# Patient Record
Sex: MALE | Race: WHITE | Employment: FULL TIME | URBAN - METROPOLITAN AREA
[De-identification: names, ages, dates, MRNs, and addresses within clinical notes are randomized per-mention and may not be internally consistent; named-entity substitution may affect disease eponyms.]

---

## 2019-01-18 ENCOUNTER — HOSPITAL ENCOUNTER (OUTPATIENT)
Dept: GENERAL RADIOLOGY | Age: 29
Discharge: HOME | End: 2019-01-20
Payer: COMMERCIAL

## 2019-01-18 ENCOUNTER — HOSPITAL ENCOUNTER (OUTPATIENT)
Age: 29
Discharge: HOME | End: 2019-01-20
Payer: COMMERCIAL

## 2019-01-18 DIAGNOSIS — S13.4XXA SPRAIN OF LIGAMENTS OF CERVICAL SPINE, INITIAL ENCOUNTER: ICD-10-CM

## 2019-01-18 PROCEDURE — 72050 X-RAY EXAM NECK SPINE 4/5VWS: CPT

## 2022-02-14 ENCOUNTER — HOSPITAL ENCOUNTER (OUTPATIENT)
Dept: PHYSICAL THERAPY | Age: 32
Setting detail: THERAPIES SERIES
Discharge: HOME | End: 2022-02-14
Payer: COMMERCIAL

## 2022-02-14 PROCEDURE — 97161 PT EVAL LOW COMPLEX 20 MIN: CPT

## 2022-02-14 NOTE — PROGRESS NOTES
Physical Therapy  Initial Assessment  Date: 2022  Patient Name: Jade Spence  MRN: [id number]  : 1990          Restrictions       Subjective   General  Chart Reviewed: Yes  Patient assessed for rehabilitation services?: Yes  Additional Pertinent Hx: Client presents to PT to assess and treat an acute left shoulder/left anterior chest injury. Per Client: He was working as a  for [employer]. Client was applying medial pressure to 2 pipe wrenches when he heard and felt \"a pop\" in the left anterior shoulder region Client noted immediate onset of pain and limited function Later client noted onset of edema and significant bruising  Family / Caregiver Present: No  Referring Practitioner: Dr Jaleel Artis  Referral Date : 22  Diagnosis: Left Shoulder/Left Anterior Chest injury  Follows Commands: Within Functional Limits  PT Visit Information  Onset Date: 22  PT Insurance Information: [employer]  Total # of Visits Approved: 10  Plan of Care/Certification Expiration Date: 22  Subjective  Subjective: Pain along the lateral aspect of the left anterior chest/pectoralis region Pain noted with direct palaption and with hoding or pushing up with the left UE  Pain Screening  Patient Currently in Pain: No  Vital Signs  Patient Currently in Pain: No    Vision/Hearing  Vision  Vision: Within Functional Limits  Hearing  Hearing: Within functional limits    Orientation  Orientation  Overall Orientation Status: Within Functional Limits    Social/Functional History  Social/Functional History  Lives With: Family  Type of Home: House  Receives Help From: Family  Homemaking Responsibilities: Yes  Active : Yes  Mode of Transportation: Car  Occupation: Workers comp; Full time employment    Objective     Observation/Palpation  Posture: Fair  Palpation: Pain noted with palpation deep lateral border of the left anterior chest. No palpable defect noted in the left pec Major mucsle or left Pec Minor tendon. Observation: No asymmetry noted right pec region vs left pec region Modest visible edema left anteriro lateral chest    PROM RUE (degrees)  RUE PROM: WFL  AROM RUE (degrees)  RUE AROM : WFL  PROM LUE (degrees)  LUE PROM: WFL  LUE General PROM: Pain noted with left shoulder horizontal adduciotn and ER  AROM LUE (degrees)  LUE General AROM: Limited shoulder abduction ER and horizontal adduaction Pain noted with AROM    Strength RUE  Comment: 4+/5  Strength LUE  Comment: 4/5  Strength Other  Other: Left Anteriro chest 4/5 Paijn noted with resisted shoulder adduction                                                   Assessment   Conditions Requiring Skilled Therapeutic Intervention  Body structures, Functions, Activity limitations: Decreased functional mobility ; Increased pain;Decreased ROM; Decreased strength  Assessment: left Anterior shoulder/chest region pain Symptoms suggest involvement of the Right Pec Minor muscle but additional or alternative pathologies cannot be ruled out at present  Prognosis: Fair  Decision Making: Low Complexity  REQUIRES PT FOLLOW UP: Yes         Plan   Plan  Times per week: 2-3  Plan weeks: 4-5  Current Treatment Recommendations: Strengthening,Home Exercise Program,Manual Therapy - Soft Tissue Mobilization,ROM,Patient/Caregiver Education & Training,Functional Mobility Training,Modalities    G-Code       OutComes Score                                                  AM-PAC Score             Goals          Therapy Time   Individual Concurrent Group Co-treatment   Time In 1400         Time Out 1440         Minutes 40         Timed Code Treatment Minutes: 27 Minutes       Tony Severe, PT

## 2022-02-15 ENCOUNTER — HOSPITAL ENCOUNTER (OUTPATIENT)
Dept: PHYSICAL THERAPY | Age: 32
Setting detail: THERAPIES SERIES
Discharge: HOME | End: 2022-02-15
Payer: COMMERCIAL

## 2022-02-15 PROCEDURE — G0283 ELEC STIM OTHER THAN WOUND: HCPCS

## 2022-02-15 PROCEDURE — 97110 THERAPEUTIC EXERCISES: CPT

## 2022-02-15 NOTE — PROGRESS NOTES
[facility]  Phone: [phone] Fax: [phone]       Physical Therapy Daily Treatment Note  Date:  2/15/2022    Patient Name:  Amanda Ontiveros   :  1990    Restrictions/Precautions:    Diagnosis:  Left Shoulder/chest wall injury  Treatment Diagnosis:    Insurance/Certification information:    Referring Physician:    Plan of care signed (Y/N):    Visit# / total visits:  /  Pain level: /10  Time In:   1400     Time Out:  1455        Subjective:      Exercises:  Exercise/Equipment Resistance/Repetitions Other comments            Seated flex with ball  10 reps       Supine Shoulder flexion 10 reps 2 sets      Supine Shoulder scaption 10 reps bilateral 2 sets       Supine Shoulder neville 10 reps 2 sets                                                                                                            Other Therapeutic Activities:      Home Exercise Program:      Manual Treatments:  GH and scapular ROM with gentle joint mobilization     Modalities:  Pre Mod estim left shoulder/left scapular region 20 min     Timed Code Treatment Minutes:  30    Total Treatment Minutes:  55    Treatment/Activity Tolerance:  [x] Patient tolerated treatment well [] Patient limited by fatigue  [] Patient limited by pain  [] Patient limited by other medical complications  [] Other:     Plan:   [x] Continue per plan of care [] Alter current plan (see comments)  [] Plan of care initiated [] Hold pending MD visit [] Discharge  Plan for Next Session:         Treatment Charges: Mins Units   Initial Evaluation     Re-Evaluation     Ther Exercise         TE 15 1   Manual Therapy     MT 15 1   Ther Activities        TA     Gait Training          GT     Neuro Re-education NR     Modalities 20 1   Non-Billable Service Time 5    Other     Total Time/Units 55 2     Electronically signed by:  Cain Ricardo, [address] [place] [address]

## 2022-02-17 ENCOUNTER — HOSPITAL ENCOUNTER (OUTPATIENT)
Dept: PHYSICAL THERAPY | Age: 32
Setting detail: THERAPIES SERIES
Discharge: HOME | End: 2022-02-17
Payer: COMMERCIAL

## 2022-02-17 PROCEDURE — 97140 MANUAL THERAPY 1/> REGIONS: CPT

## 2022-02-17 PROCEDURE — G0283 ELEC STIM OTHER THAN WOUND: HCPCS

## 2022-02-17 PROCEDURE — 97110 THERAPEUTIC EXERCISES: CPT

## 2022-02-17 NOTE — PROGRESS NOTES
[facility]  Phone: [phone] Fax: [phone]       Physical Therapy Daily Treatment Note  Date:  2022    Patient Name:  Usman Mcgee   :  1990    Restrictions/Precautions:    Diagnosis:  Left Shoulder/chest wall injury  Treatment Diagnosis:    Insurance/Certification information:    Referring Physician:    Plan of care signed (Y/N):    Visit# / total visits: 3/10  Pain level: /10  Time In:   1400     Time Out:  1606        Subjective:  Client reports pain has migrated into a more superior/medial aspect of the left shoulder since last session. Exercises:  Exercise/Equipment Resistance/Repetitions Other comments            Seated flex with ball  10 reps       Supine Shoulder flexion 10 reps 2 sets      Supine Shoulder scaption 10 reps bilateral 2 sets  nt     Supine Shoulder neville 10 reps 2 sets  nt                                                                                                          Other Therapeutic Activities:      Home Exercise Program:      Manual Treatments:  GH and scapular ROM with gentle joint mobilization     Modalities:  Pre Mod estim left shoulder/left scapular region 20 min     Timed Code Treatment Minutes:  30    Total Treatment Minutes:  55    Treatment/Activity Tolerance:  [x] Patient tolerated treatment well [] Patient limited by fatigue  [] Patient limited by pain  [] Patient limited by other medical complications  [x] Other: Pain was aggravated by [address] abduction above 90* and with shoulder IR/ER ROM In addition MMT of the left shoulder IRR's demonstrated significant weakness but was negative for pain     Plan:   [x] Continue per plan of care [] Alter current plan (see comments)  [] Plan of care initiated [] Hold pending MD visit [] Discharge  Recommendation: Symptoms suggest the possibility of an injury to the left Subscapularis muscle.  Recommend consideration of MRI to rule out this possibility         Treatment Charges: Mins Units   Initial Evaluation     Re-Evaluation     Ther Exercise         TE 15 1   Manual Therapy     MT 15 1   Ther Activities        TA     Gait Training          GT     Neuro Re-education NR     Modalities 20 1   Non-Billable Service Time 5    Other     Total Time/Units 55 3     Electronically signed by:  Florencio Macedo, [address] [facility]

## 2022-02-22 ENCOUNTER — HOSPITAL ENCOUNTER (OUTPATIENT)
Dept: PHYSICAL THERAPY | Age: 32
Setting detail: THERAPIES SERIES
Discharge: HOME | End: 2022-02-22
Payer: COMMERCIAL

## 2022-02-22 PROCEDURE — G0283 ELEC STIM OTHER THAN WOUND: HCPCS

## 2022-02-22 NOTE — PROGRESS NOTES
[facility]  Phone: [phone] Fax: [phone]       Physical Therapy Daily Treatment Note  Date:  2022    Patient Name:  Dionicio Luna   :  1990    Restrictions/Precautions:    Diagnosis:  Left Shoulder/chest wall injury  Treatment Diagnosis:    Insurance/Certification information:    Referring Physician:    Plan of care signed (Y/N):    Visit# / total visits: 4/10  Pain level: /10  Time In:   1400     Time Out:  1425        Subjective:  Client presented to PT reporting that the pain in his left shoulder w had persisted since last session and that he had noted the presence of new bruising over the area. Exercises:  Exercise/Equipment Resistance/Repetitions Other comments            Seated flex with ball  10 reps  nt     Supine Shoulder flexion 10 reps 2 sets nt     Supine Shoulder scaption 10 reps bilateral 2 sets  nt     Supine Shoulder neville 10 reps 2 sets  nt                                                                                                          Other Therapeutic Activities:      Home Exercise Program:      Manual Treatments:     Modalities:  Pre Mod estim left shoulder/left scapular region 20 min     Timed Code Treatment Minutes:  15    Total Treatment Minutes:  25    Treatment/Activity Tolerance:  [x] Patient tolerated treatment well [] Patient limited by fatigue  [] Patient limited by pain  [] Patient limited by other medical complications  [x] Other: visual examination of the left shoulder showed modest to moderate bruising over the patsy-medial aspect of the left shoulder medial to the axilla     Plan:   [x] Continue per plan of care [] Alter current plan (see comments)  [] Plan of care initiated [] Hold pending MD visit [] Discharge  Recommendation: Due to the apparent worsening of the bruising over the affected area the decision was made to hold any active treatment until client is seen by his physician.   An attempt was made to contact the physician but PT was only able to leave a message       Treatment Charges: Mins Units   Initial Evaluation     Re-Evaluation     Ther Exercise         TE     Manual Therapy     MT     Ther Activities        TA     Gait Training          GT     Neuro Re-education NR     Modalities 20 1   Non-Billable Service Time 5    Other     Total Time/Units 25 1     Electronically signed by:  Catie Bermudez, [address] [place] [address]

## 2022-03-04 ENCOUNTER — OFFICE VISIT (OUTPATIENT)
Dept: ORTHOPEDIC SURGERY | Age: 32
End: 2022-03-04
Payer: COMMERCIAL

## 2022-03-04 VITALS — BODY MASS INDEX: 29.12 KG/M2 | HEIGHT: 72 IN | WEIGHT: 215 LBS

## 2022-03-04 DIAGNOSIS — S29.011A STRAIN OF LEFT PECTORALIS MUSCLE, INITIAL ENCOUNTER: Primary | ICD-10-CM

## 2022-03-04 PROCEDURE — 99203 OFFICE O/P NEW LOW 30 MIN: CPT | Performed by: ORTHOPAEDIC SURGERY

## 2022-03-04 NOTE — PROGRESS NOTES
New Shoulder Patient Visit     Referring Provider:   Jessica Diaz MD  [address],  [address] [facility] [facility] [address]    CHIEF COMPLAINT:   Chief Complaint   Patient presents with    Shoulder Pain     L Shoulder. . PT has not helped pain    Injury     Pushing 2 pipewrenches together (hurt @ work)        HPI:      Lonnie Roca is a 32y.o. year old male who is seen today for concern for left pectoralis major tear. He states that about a month ago while working he was forcefully pushing to pipe wrenches together and felt a pop and pain over his left chest and his left arm. There was bruising following this. He was referred to a physician to [facility] and was prescribed physical therapy. He did note some improvement in pain, has been worried about really testing out the shoulder. He had a second incident where he felt another pop in again had some bruising. The bruising has for the most part resolved. He is not any pain at rest.  He has been doing light duty at work. He works as a . He is right-hand dominant      PAST MEDICAL HISTORY  Past Medical History:   Diagnosis Date    GERD (gastroesophageal reflux disease)        PAST SURGICAL HISTORY  Past Surgical History:   Procedure Laterality Date    HYDROCELE EXCISION  1991    TYMPANOSTOMY TUBE PLACEMENT  1993    UPPER GASTROINTESTINAL ENDOSCOPY  04/17/2015       FAMILY HISTORY   No family history on file. SOCIAL HISTORY  Social History     Occupational History    Not on file   Tobacco Use    Smoking status: Former Smoker    Smokeless tobacco: Former User   Substance and Sexual Activity    Alcohol use:  Yes     Alcohol/week: 1.0 standard drink     Types: 1 Cans of beer per week     Comment: socially     Drug use: Not on file    Sexual activity: Not on file       CURRENT MEDICATIONS     Current Outpatient Medications:     albuterol (PROVENTIL) (2.5 MG/3ML) 0.083% nebulizer solution, Take 2.5 mg by nebulization every 6 hours as needed for Wheezing, Disp: , Rfl:     ibuprofen (ADVIL;MOTRIN) 800 MG tablet, Take 1 tablet by mouth every 8 hours as needed for Pain, Disp: 15 tablet, Rfl: 0    omeprazole (PRILOSEC) 20 MG capsule, Take 40 mg by mouth daily. , Disp: , Rfl:     ALLERGIES  Allergies   Allergen Reactions    Ceclor [Cefaclor] Hives       Controlled Substances Monitoring:        REVIEW OF SYSTEMS:     Constitutional:  Negative for weight loss, fevers, chills, fatigue  Cardiovascular: Negative for chest pain, palpitations  Pulmonary: Negative for shortness of breath, labored breathing, cough  GI: negative for abdominal pain, nausea, vomitting   MSK: per HPI  Skin: negative for rash, open wounds    All other systems reviewed and are negative           PHYSICAL EXAM     Vitals:    03/04/22 1131   Weight: 215 lb (97.5 kg)   Height: 6' (1.829 m)       Height: 6' (1.829 m)  Weight: [unfilled]  BMI:  Body mass index is 29.16 kg/m². General: The patient is alert and oriented x 3, appears to be stated age and in no distress. HEENT: head is normocephalic, atraumatic. EOMI. Neck: supple, trachea midline, no thyromegaly   Cardiovascular: peripheral pulses palpable. Normal Capillary refill   Respiratory: breathing unlabored, chest expansion symmetric   Skin: no rash, no open wounds, no erythema  Psych: normal affect; mood stable  Neurologic: gait normal, sensation grossly intact in extremities  MSK:    Cervical Spine: There is no tenderness to palpation along the cervical spine. Range of motion is normal.  Spurling's is negative    Shoulder Exam:   Exam of the left shoulder shows full range of motion 180/60/T6. There is some resolving bruise just above the biceps muscle belly on the medial border of the upper arm. There is no obvious contour abnormality of his pec tendon compared to the right side. I do feel that I can palpate the tendon come across the axillary fold. I do not palpate any detached tendon obviously.   When he pushes against resistance contour the chest appears similar to the other side. He does have good strength with rotator cuff testing as well as biceps labral testing which is not painful    IMAGING:     XRAY:  3 views of the left shoulder show no acute abnormality    Radiographic findings reviewed with patient    ASSESSMENT   Left shoulder concern for pectoralis major  Tear now about a month out from his injury      PLAN  We discussed his injury today and my concern that he may have torn his pec. His exam is reassuring as I feel that I can palpate the tendon come across the axillary fold. We discussed this could have been a musculotendinous junction injury or incomplete injury. I would like to obtain an urgent MRI to assess for a tear that would potentially be amenable to surgery given he is now about a month out. He is in agreement with this plan.   We will have this approved through [facility], obtain MRI as soon as possible and I will call him with results to determine treatment moving forward        Marzena Bay MD  Orthopaedic Surgery   3/4/22  6:29 PM Slurred speech

## 2022-03-14 ENCOUNTER — HOSPITAL ENCOUNTER (OUTPATIENT)
Dept: MRI IMAGING | Age: 32
Discharge: HOME | End: 2022-03-16
Payer: COMMERCIAL

## 2022-03-14 DIAGNOSIS — S29.011A STRAIN OF LEFT PECTORALIS MUSCLE, INITIAL ENCOUNTER: ICD-10-CM

## 2022-03-14 PROCEDURE — 73221 MRI JOINT UPR EXTREM W/O DYE: CPT

## 2022-03-21 ENCOUNTER — OFFICE VISIT (OUTPATIENT)
Dept: ORTHOPEDIC SURGERY | Age: 32
End: 2022-03-21
Payer: COMMERCIAL

## 2022-03-21 VITALS — BODY MASS INDEX: 29.12 KG/M2 | WEIGHT: 215 LBS | HEIGHT: 72 IN

## 2022-03-21 DIAGNOSIS — S29.011A STRAIN OF LEFT PECTORALIS MUSCLE, INITIAL ENCOUNTER: Primary | ICD-10-CM

## 2022-03-21 PROCEDURE — 99213 OFFICE O/P EST LOW 20 MIN: CPT | Performed by: ORTHOPAEDIC SURGERY

## 2022-03-21 RX ORDER — ALBUTEROL SULFATE 90 UG/1
AEROSOL, METERED RESPIRATORY (INHALATION)
COMMUNITY
Start: 2022-03-04

## 2022-03-21 RX ORDER — MONTELUKAST SODIUM 10 MG/1
TABLET ORAL
COMMUNITY
Start: 2022-03-04

## 2022-03-21 NOTE — PROGRESS NOTES
Follow Up Visit     Jade Spence returns today for follow up visit regarding Left shoulder concern for pectoralis major  Tear now about 2 months out from his injury. Treatment thus far has included obtained further imaging, MRI , he is here today to review. He reports no pain in the shoulder. He has been on light duty. Physical Exam:     Height: 6' (1.829 m), Weight: 215 lb (97.5 kg) (per pt)    General: Alert and oriented x3, no acute distress  Cardiovascular/pulmonary: No labored breathing, peripheral perfusion intact  Musculoskeletal:    Exam of the left upper extremity today shows full range of motion of the shoulder. I can palpate his pectoralis major going across his axillary crease down to his humerus. There is no tenderness in this area, no swelling or bruising. He has good abduction strength against resistance, he can do a push-up against the wall without pain. He has excellent rotator cuff strength    Controlled Substances Monitoring:      Imaging:  MRI of the shoulder was reviewed. This shows concern for least high-grade partial injury involving his pectoralis major although there is still evidence of portion of the tendon extending down to the humerus. There are some perilabral cysts of the anterior-inferior glenoid of the unclear significance. Rotator cuff is grossly intact      Assessment: Left shoulder high-grade pectoralis major musculotendinous junction tear now 2 months out      Plan:   We discussed his shoulder and injury today. Discussed exam findings, I suspect this was a partial tear likely musculotendinous junction, rather than tendon avulsing from bone. This is supported by his exam which shows no abnormality in appearance across the axillary fold with palpable pectoralis major tendon. He also has no pain and full range of motion and excellent strength today. We discussed continued conservative treatment versus surgical management.   I think the indicated treatment at this point is continued conservative treatment given his no pain and good function. I do recommend another 4 weeks of light duty at work with no heavy lifting and I would like to get him back into some physical therapy. We will check him back in 1 month and likely release him to progress with activities as tolerated at that point.   He is in agreement with this plan    Danny Ireland MD  Orthopaedic Surgery   3/21/22  2:59 PM

## 2022-04-06 ENCOUNTER — HOSPITAL ENCOUNTER (OUTPATIENT)
Dept: PHYSICAL THERAPY | Age: 32
Setting detail: THERAPIES SERIES
Discharge: HOME | End: 2022-04-06
Payer: COMMERCIAL

## 2022-04-06 PROCEDURE — 97161 PT EVAL LOW COMPLEX 20 MIN: CPT

## 2022-04-06 NOTE — FLOWSHEET NOTE
[facility]  Phone: [phone] Fax: [phone] Initial Evaluation      Client Name: Svitlana Mora    Claim [id number]:[id number]  Evaluation date:04/06/2022                                     Job Title :[employer]   Diagnosis:_S29.011A                                    Normal Work Hrs: 40/wk  Referring Physician: Dr Logan Goldman                            Present work status:  First date of Lost time: 01/25/2022                         * Not working  ___  Date of Injury: 01/25/2022                                        Laya Perry Duty    __X__  Employer:   [address] [place] [address].  Duty     ____    Authorized Services:   _X__ Physical Therapy Exercises  ___ Work Conditioning  ___ Aquatics  _X__ Manual therapy  _X__ Electrical Stimulation, Traction, Ultrasound  __X_ Education/Body mechanics  ___ Ergonomic Assessment/FCE    Authorized Visits: ________  Sharmaine Baerwood ____18___Visits  By 05/06/2022   From 03/25/2022  To 05/06/2022    Vocational Status:  Employer: [employer]   Job Title: [employer]     Rehabilitation Problems/Impairments:  []Pain  ___/10 ___________________________________________  []Decreased ROM:________________________________________  []Joint Hypomobility:______________________________________  []Joint Hypermobility:______________________________________  []Muscle Spasms:_________________________________________  []Gait: __________________________________________________  [x]Decreased strength:Left shoulder region   []Unsafe body mechanics related to work/home activities  []Subjective reports of pain limiting work/home activities  []Inability to control onset symptoms  [x]Load handling strength levels below employable levels  [x]Work endurances less than required for employment levels  []Other:__________________________      Short Term Rehabilitation Goals:      [] Decrease pain __/10:______________________________________      [] Increase ROM:___________________________________________       [] Improve Joint play:________________________________________      [] Improve strength:_________________________________________       []Gait: __________________________________________________       [] Reduce Muscle Spasms:____________________________________                    [] Improve Body Mechanics associated with work/Home activities. [x] Instruct with symptom control techniques/ HEP                    [] Improve endurance levels                     [] Lift floor-waist __________#                     [] Lift waist-shoulder _______#                    [] Horizontal carry _________Ft ________#                    [] Elevated work ________min _________#                    [] Other___________________________________    Long Term Rehabilitation Goals:   [x]RTW  ( SJ/SE, SJ/DE, DJ/SE, DJ/DE)  []Decrease Pain ___/10   []Improve AROM  ( WNL), ________________________________  [x]Improve Strength to 4 to 4+/5  []Improve Joint play (WNL), _______________________________  []Body Mechanics:________________________________________              []Gait ______________________________  [x]Independent with HEP/Symptom control techniques:  []Lift Floor-waist_____#  []Lift waist-Shoulder________#  []Horizontal Carry ______ft______#  []Elevated work ____min____#  []Other:_______________________    Rehabilitation Recommendations:                    ?[x] Begin PT at __2-3____x/wk x ____6__wks. []Begin PT with Work Conditioning to follow at ______wks. []Begin Work-Conditioning and conclude with FCE _____                   []? Begin PT with Vocational Rehabilitation referral/request--[facility]                   ? [] Ergonomic Study/Job Analysis to compare FCE/clients abilities.     Recommendations For RTW accommodations:  None at this time           Physician: ____________________  Therapist: Marcela Rudd, [address]  : ____________________

## 2022-04-06 NOTE — PROGRESS NOTES
Physical Therapy  Initial Assessment  Date: 2022  Patient Name: Garrick Delacruz  MRN: [id number]  : 1990          Restrictions       Subjective   General  Chart Reviewed: Yes  Patient assessed for rehabilitation services?: Yes  Additional Pertinent Hx: Client presents to PT to resume PT secondary to and acute left shoulder injury. Left shoulder was injuredwhile client was working as a . Injury occurred 2022. Client initiated PT but services were interrupted when client experienced acuyte onset of bruising int he left shoulder Subsequent CT scan and MRI were + for an injury to the lpeft [place] muscle Client consulted with orthopedic physician Dr Conchita Clements Injury is considered non surgical at this time Client has been referred back to PT  Family / Caregiver Present: No  Referring Practitioner: Dr Conchita Clements  Referral Date : 22  Diagnosis: Left Shoulder injury  Follows Commands: Within Functional Limits  PT Visit Information  Onset Date: 22  PT Insurance Information: [employer]  Total # of Visits Approved: 18  Subjective  Subjective: Minimal pain No restriction on ROM  Pain Screening  Patient Currently in Pain: No  Vital Signs  Patient Currently in Pain: No    Vision/Hearing  Vision  Vision: Within Functional Limits  Hearing  Hearing: Within functional limits    Orientation  Orientation  Overall Orientation Status: Within Functional Limits    Social/Functional History  Social/Functional History  Lives With: Family  Homemaking Responsibilities: Yes  Active : Yes  Mode of Transportation: Car  Occupation: Full time employment; Workers comp    Objective     Observation/Palpation  Posture: Fair  Palpation: No pain noted with palpation left anterior shoulder left lateral chest reigon.  There is a palpable thickening of musculo-tendinous tissue noted left lateral pec reiogn  Observation: No postural guarding or pain behavikors noted    AROM RUE (degrees)  RUE AROM : WFL  AROM LUE (degrees)  LUE AROM : WFL  Spine  Cervical: WFL's    Strength RUE  Comment: 4+/5  Strength LUE  Comment: 4- to 4/5  Strength Other  Other: Posterior cervical/scapulo-thoracic reiogn 4- to 4/5 left                                                   Assessment   Conditions Requiring Skilled Therapeutic Intervention  Body structures, Functions, Activity limitations: Decreased strength;Decreased endurance  Prognosis: Fair  Decision Making: Low Complexity  REQUIRES PT FOLLOW UP: Yes         Plan   Plan  Times per week: 2-3  Plan weeks: 4-6  Current Treatment Recommendations: Strengthening,Home Exercise Program,Endurance Training,Patient/Caregiver Education & Training,Functional Mobility Training    G-Code       OutComes Score                                                  AM-PAC Score             Goals          Therapy Time   Individual Concurrent Group Co-treatment   Time In 1500         Time Out 1540         Minutes 40         Timed Code Treatment Minutes: 27 Minutes       Frida Lopez PT

## 2022-04-07 ENCOUNTER — HOSPITAL ENCOUNTER (OUTPATIENT)
Dept: PHYSICAL THERAPY | Age: 32
Setting detail: THERAPIES SERIES
Discharge: HOME | End: 2022-04-07
Payer: COMMERCIAL

## 2022-04-07 PROCEDURE — G0283 ELEC STIM OTHER THAN WOUND: HCPCS

## 2022-04-07 PROCEDURE — 97110 THERAPEUTIC EXERCISES: CPT

## 2022-04-07 NOTE — PROGRESS NOTES
[facility]  Phone: [phone] Fax: [phone]       Physical Therapy Daily Treatment Note  Date:  2022    Patient Name:  Blayne Chavez    :  1990  MRN: [id number]    Restrictions/Precautions:    Diagnosis:  Left Shoulder Injury  Treatment Diagnosis:    Insurance/Certification information:  [employer] [place] (18 thru 2022)  Referring Physician:  Dr. Mahan Neither of care signed (Y/N):    Visit# / total visits:    Pain level:  0/10  Time In: 16:00       Time Out: 16:45         Subjective:  Pt reports no pain this afternoon. Exercises:  Exercise/Equipment Resistance/Repetitions Other comments   UBE 6 min 3 fwd/3 bckwd   Cybex 360  extension 1.5 pl 10 x (90* elev to 0*)                     rowing 1.5 pl 10 x Limited range                    Fwd press 1.5 pl 10 x   (from neutral) Limited range                    ER 1 pl 10 x Towel under arm                    IR 1 pl 10 x Towel under arm          Shoulder shrugs  5# 10 x    Scapular retraction 5# 10 x             Bicep curls 5# 10 x    Tricep kick backs 5# 10 x           Ball pushups @ wall  10 x limited range                                         Other Therapeutic Activities:      Home Exercise Program:      Manual Treatments:      Modalities: IFC and ice to left shoulder x 20 min for pain control. Timed Code Treatment Minutes:  45    Total Treatment Minutes:  45    Treatment/Activity Tolerance:  [x] Patient tolerated treatment well [] Patient limited by fatigue  [] Patient limited by pain  [] Patient limited by other medical complications  [x] Other: Pt tolerated all exercises well with no report of pain increase. He exhibits mild hesitation to perform strengthening for fear further tear.      Plan:   [x] Continue per plan of care [] Alter current plan (see comments)  [] Plan of care initiated [] Hold pending MD visit [] Discharge  Plan for Next Session:         Treatment Charges: Mins Units Initial Evaluation     Re-Evaluation     Ther Exercise         TE 25 2   Manual Therapy     MT     Ther Activities        TA     Gait Training          GT     Neuro Re-education NR     Modalities 20 1   Non-Billable Service Time     Other     Total Time/Units 45 3     Electronically signed by:   John Daugherty

## 2022-04-11 ENCOUNTER — HOSPITAL ENCOUNTER (OUTPATIENT)
Dept: PHYSICAL THERAPY | Age: 32
Setting detail: THERAPIES SERIES
Discharge: HOME | End: 2022-04-11
Payer: COMMERCIAL

## 2022-04-11 PROCEDURE — 97110 THERAPEUTIC EXERCISES: CPT

## 2022-04-11 PROCEDURE — G0283 ELEC STIM OTHER THAN WOUND: HCPCS

## 2022-04-13 ENCOUNTER — HOSPITAL ENCOUNTER (OUTPATIENT)
Dept: PHYSICAL THERAPY | Age: 32
Setting detail: THERAPIES SERIES
Discharge: HOME | End: 2022-04-13
Payer: COMMERCIAL

## 2022-04-13 PROCEDURE — 97110 THERAPEUTIC EXERCISES: CPT

## 2022-04-13 PROCEDURE — G0283 ELEC STIM OTHER THAN WOUND: HCPCS

## 2022-04-13 NOTE — PROGRESS NOTES
[facility]  Phone: [phone] Fax: [phone]       Physical Therapy Daily Treatment Note  Date:  2022    Patient Name:  Barbara Naranjo    :  1990  MRN: [id number]    Restrictions/Precautions:    Diagnosis:  Left Shoulder Injury  Treatment Diagnosis:    Insurance/Certification information:  [employer] (18 thru 2022)  Referring Physician:  Dr. Desirae Andrea of care signed (Y/N):    Visit# / total visits:    Pain level:  0/10  Time In: 720       Time Out: 820        Subjective: Client reports no pain associated with last PT sessoion     Exercises:  Exercise/Equipment Resistance/Repetitions Other comments   UBE 8 min 4 fwd/4  bckwd   Cybex 360  extension 2.5 pl 2x10 x (90* elev to 0*)                     rowing 2.5 2xpl 10 x Limited range                    Fwd press 2.5  pl 10 x   (from neutral) Limited range                    ER 1 pl 10 x Towel under arm                    IR 1 pl 10 x Towel under arm          Shoulder shrugs  7# 10 x    Scapular retraction 7# 10 x             Bicep curls 7# 10 x    Tricep kick backs 7# 10 x           Ball pushups @ wall  10 x limited range             Seated flex with ball  10 reps       Supine pec str  1-0 reps                     Other Therapeutic Activities:Crate carry 15# Waist level 3 min                                                        Crate lift  10 reps 15#    Home Exercise Program:      Manual Treatments:      Modalities: IFC and ice to left shoulder x 20 min for pain control.     Timed Code Treatment Minutes:  45    Total Treatment Minutes:  60    Treatment/Activity Tolerance:  [x] Patient tolerated treatment well [] Patient limited by fatigue  [] Patient limited by pain  [] Patient limited by other medical complications  [] Other:     Plan:   [x] Continue per plan of care [] Alter current plan (see comments)  [] Plan of care initiated [] Hold pending MD visit [] Discharge  Plan for Next Session:         Treatment Charges: Mins Units   Initial Evaluation     Re-Evaluation     Ther Exercise         TE 30 2   Manual Therapy     MT     Ther Activities        TA     Gait Training          GT     Neuro Re-education NR     Modalities 20 1   Non-Billable Service Time 10    Other     Total Time/Units 60 3     Electronically signed by:  Zoraida Coleman, [address] [place] [address]

## 2022-04-15 ENCOUNTER — HOSPITAL ENCOUNTER (OUTPATIENT)
Dept: PHYSICAL THERAPY | Age: 32
Setting detail: THERAPIES SERIES
Discharge: HOME | End: 2022-04-15
Payer: COMMERCIAL

## 2022-04-15 PROCEDURE — G0283 ELEC STIM OTHER THAN WOUND: HCPCS

## 2022-04-15 PROCEDURE — 97110 THERAPEUTIC EXERCISES: CPT

## 2022-04-15 NOTE — PROGRESS NOTES
[facility]  Phone: [phone] Fax: [phone]       Physical Therapy Daily Treatment Note  Date:  4/15/2022    Patient Name:  Hosea Lee    :  1990  MRN: [id number]    Restrictions/Precautions:    Diagnosis:  Left Shoulder Injury  Treatment Diagnosis:    Insurance/Certification information:  [employer] (18 thru 2022)  Referring Physician:  Dr. Felisha Hickman of care signed (Y/N):    Visit# / total visits:    Pain level:  0/10  Time In: 720       Time Out: 820        Subjective:     Exercises:  Exercise/Equipment Resistance/Repetitions Other comments   UBE 10 min 4 fwd/4  bckwd   Cybex 360  extension 3 pl 2x10 x (90* elev to 0*)                     rowing 3  2xpl 10 x Limited range                    Fwd press 2.5  pl 10 x   (from neutral) Limited range                    ER 1 pl 10 x Towel under arm                    IR 1 pl 10 x Towel under arm          Shoulder shrugs  8# 10 x    Scapular retraction 8# 10 x             Bicep curls 8# 10 x    Tricep kick backs 8# 10 x           Ball pushups @ wall  10 x limited range             Seated flex with ball  10 reps       Supine pec str  1-0 reps                     Other Therapeutic Activities:Crate carry 20# Waist level 3 min                                                        Crate lift  10 reps 20#    Home Exercise Program:      Manual Treatments:      Modalities: IFC and ice to left shoulder x 20 min for pain control.     Timed Code Treatment Minutes:  45    Total Treatment Minutes:  60    Treatment/Activity Tolerance:  [x] Patient tolerated treatment well [] Patient limited by fatigue  [] Patient limited by pain  [] Patient limited by other medical complications  [] Other:     Plan:   [x] Continue per plan of care [] Alter current plan (see comments)  [] Plan of care initiated [] Hold pending MD visit [] Discharge  Plan for Next Session:         Treatment Charges: Mins Units   Initial Evaluation     Re-Evaluation     Ther Exercise         TE 30 2   Manual Therapy     MT     Ther Activities        TA     Gait Training          GT     Neuro Re-education NR     Modalities 20 1   Non-Billable Service Time 10    Other     Total Time/Units 60 3     Electronically signed by:  Jed Powell, [address] [facility]

## 2022-04-18 ENCOUNTER — HOSPITAL ENCOUNTER (OUTPATIENT)
Dept: PHYSICAL THERAPY | Age: 32
Setting detail: THERAPIES SERIES
Discharge: HOME | End: 2022-04-18
Payer: COMMERCIAL

## 2022-04-18 ENCOUNTER — OFFICE VISIT (OUTPATIENT)
Dept: ORTHOPEDIC SURGERY | Age: 32
End: 2022-04-18
Payer: COMMERCIAL

## 2022-04-18 DIAGNOSIS — S29.011D STRAIN OF LEFT PECTORALIS MUSCLE, SUBSEQUENT ENCOUNTER: Primary | ICD-10-CM

## 2022-04-18 PROCEDURE — G0283 ELEC STIM OTHER THAN WOUND: HCPCS

## 2022-04-18 PROCEDURE — 99213 OFFICE O/P EST LOW 20 MIN: CPT | Performed by: ORTHOPAEDIC SURGERY

## 2022-04-18 PROCEDURE — 97110 THERAPEUTIC EXERCISES: CPT

## 2022-04-18 NOTE — PROGRESS NOTES
Follow Up Visit     Vicki Arauz returns today for follow up visit regarding left shoulder high-grade pectoralis major musculotendinous junction tear from 1/25/2022. Treatment thus far has included light duty work and physical therapy. He reports symptoms are improved. REVIEW OF SYSTEMS:     Constitutional:  Negative for weight loss, fevers, chills, fatigue  Cardiovascular: Negative for chest pain, palpitations  Pulmonary: Negative for shortness of breath, labored breathing, cough  GI: negative for abdominal pain, nausea, vomitting   MSK: per HPI  Skin: negative for rash, open wounds    All other systems reviewed and are negative       Physical Exam:     No data recorded    General: Alert and oriented x3, no acute distress  Cardiovascular/pulmonary: No labored breathing, peripheral perfusion intact  Musculoskeletal:    Left shoulder exam range of motion 170/75/T6. Umair speeds and [place]'s exams were intact. No swelling deformity visualized on inspection. Nontender to palpation. Pectoralis major palpable across axillary crease without pain swelling or deformity. Controlled Substances Monitoring:      Imaging:  No new imaging obtained today. Previous imaging including MRI reviewed showing concern for high-grade partial injury of his pectoralis major. No obvious tearing of the rotator cuff present. Assessment: Left shoulder partial pectoralis major musculotendinous junction near 3 months out. Plan: Today we discussed his left shoulder. Patient reports symptoms are improved today. He denies pain with daily activities. He has tolerated activities not exceeding 15 pounds above head or 30 pounds down at his sides. On exam he has full range of motion without pain or weakness present. Patient recently began physical therapy 2 weeks ago. We discussed continuation of PT until completed. He can continue to normal progression of activities as tolerated.   He will follow-up in 3 months. LIGIA Lira-CNP  Orthopedic Surgery   04/18/22  4:41 PM      Staff Addendum    I have seen and evaluated the patient and agree with the assessment and plan as documented by Franca Kramer CNP. I have performed the key components of the history and physical examination and concur with the findings and plan, and have made changes where appropriate/necessary.           Annabelle Jenkins MD  [facility]

## 2022-04-18 NOTE — PROGRESS NOTES
[facility]  Phone: [phone] Fax: [phone]       Physical Therapy Daily Treatment Note  Date:  2022    Patient Name:  Cinthia Cardona    :  1990  MRN: [id number]    Restrictions/Precautions:    Diagnosis:  Left Shoulder Injury  Treatment Diagnosis:    Insurance/Certification information:  [facility] (18 thru 2022)  Referring Physician:  Dr. Luis Enrique Paris of care signed (Y/N):    Visit# / total visits:    Pain level:  0/10  Time In: 720       Time Out: 820        Subjective:     Exercises:  Exercise/Equipment Resistance/Repetitions Other comments   UBE 10 min 4 fwd/4  bckwd   Cybex 360  extension 3 pl 2x10 x (90* elev to 0*)                     rowing 3  2xpl 10 x Limited range                    Fwd press 2.5  pl 10 x   (from neutral) Limited range                    ER 1 pl 10 x Towel under arm                    IR 1 pl 10 x Towel under arm          Shoulder shrugs  8# 10 x    Scapular retraction 8# 10 x             Bicep curls 8# 10 x    Tricep kick backs 8# 10 x           Ball pushups @ wall  10 x limited range             Seated flex with ball  10 reps       Supine pec str  1-0 reps                     Other Therapeutic Activities:Crate carry 20# Waist level 3 min                                                        Crate lift  10 reps 20#    Home Exercise Program:      Manual Treatments:      Modalities: IFC and ice to left shoulder x 20 min for pain control.     Timed Code Treatment Minutes:  45    Total Treatment Minutes:  60    Treatment/Activity Tolerance:  [x] Patient tolerated treatment well [] Patient limited by fatigue  [] Patient limited by pain  [] Patient limited by other medical complications  [] Other:     Plan:   [x] Continue per plan of care [] Alter current plan (see comments)  [] Plan of care initiated [] Hold pending MD visit [] Discharge  Plan for Next Session:         Treatment Charges: Mins Units   Initial Evaluation     Re-Evaluation     Ther Exercise         TE 30 2   Manual Therapy     MT     Ther Activities        TA     Gait Training          GT     Neuro Re-education NR     Modalities 20 1   Non-Billable Service Time 10    Other     Total Time/Units 60 3     Electronically signed by:  Adriana Isaac, [address] [place] [address]

## 2022-04-19 ENCOUNTER — HOSPITAL ENCOUNTER (OUTPATIENT)
Dept: PHYSICAL THERAPY | Age: 32
Setting detail: THERAPIES SERIES
Discharge: HOME | End: 2022-04-19
Payer: COMMERCIAL

## 2022-04-19 PROCEDURE — G0283 ELEC STIM OTHER THAN WOUND: HCPCS

## 2022-04-19 PROCEDURE — 97530 THERAPEUTIC ACTIVITIES: CPT

## 2022-04-19 PROCEDURE — 97110 THERAPEUTIC EXERCISES: CPT

## 2022-04-19 NOTE — PROGRESS NOTES
[facility]  Phone: [phone] Fax: [phone]       Physical Therapy Daily Treatment Note  Date:  2022    Patient Name:  Zayra Ricardo    :  1990  MRN: [id number]    Restrictions/Precautions:    Diagnosis:  Left Shoulder Injury  Treatment Diagnosis:    Insurance/Certification information:  [employer] (18 thru 2022)  Referring Physician:  Dr. Va Barker of care signed (Y/N):    Visit# / total visits:    Pain level:  0/10  Time In: 7:20       Time Out: 8:30        Subjective: Pt states he had his follow up with Dr. Emma Aguillon and was advised he is doing well and can progress activity. States his appointment with [facility] physician is next month. Pt does report of lifting a toilet at work to place it without issues. Exercises:  Exercise/Equipment Resistance/Repetitions Other comments   UBE 10 min fwd/bckwd   Cybex 360  extension 3 pl 2x10                      rowing 3pl  2 x 10  Limited range                    Fwd press 3 pl  pl 10 x   (from neutral) Limited range                    ER 1.5 pl 10 x 2 Towel under arm                    IR 1.5 pl 10 x 2 Towel under arm          Shoulder shrugs  10# 10 x 2    Scapular retraction 10# 10 x 2             Bicep curls 10# 10 x 2    Tricep kick backs 10# 10 x 2           Ball pushups @ wall  10 x limited range             Seated flex with ball  10 reps       Supine pec str  10 reps            BTE push/pull lever   3 sets      Other Therapeutic Activities:Crate carry 20# Waist level 3 min                                                        Crate lift 6 reps x 2 20#                                                     Crate lift to high shelf x w/15#  Overhead nut and bolt remove and return (3 min)    Home Exercise Program:      Manual Treatments:      Modalities: IFC and ice to left shoulder x 20 min for pain control.     Timed Code Treatment Minutes:  65    Total Treatment Minutes: 70    Treatment/Activity Tolerance:  [x] Patient tolerated treatment well [] Patient limited by fatigue  [] Patient limited by pain  [] Patient limited by other medical complications  [x] Other: Good tolerance for progression of exercises. No pain reported. Plan:   [x] Continue per plan of care [] Alter current plan (see comments)  [] Plan of care initiated [] Hold pending MD visit [] Discharge  Plan for Next Session:         Treatment Charges: Mins Units   Initial Evaluation     Re-Evaluation     Ther Exercise         TE 30 2   Manual Therapy     MT     Ther Activities        TA 15 1   Gait Training          GT     Neuro Re-education NR     Modalities 20 1   Non-Billable Service Time     Other 5    Total Time/Units 70 4     Electronically signed by:   John Daugherty

## 2022-04-21 ENCOUNTER — HOSPITAL ENCOUNTER (OUTPATIENT)
Dept: PHYSICAL THERAPY | Age: 32
Setting detail: THERAPIES SERIES
Discharge: HOME | End: 2022-04-21
Payer: COMMERCIAL

## 2022-04-21 PROCEDURE — G0283 ELEC STIM OTHER THAN WOUND: HCPCS

## 2022-04-21 PROCEDURE — 97530 THERAPEUTIC ACTIVITIES: CPT

## 2022-04-21 PROCEDURE — 97110 THERAPEUTIC EXERCISES: CPT

## 2022-04-21 NOTE — PROGRESS NOTES
[facility]  Phone: [phone] Fax: [phone]       Physical Therapy Daily Treatment Note  Date:  2022    Patient Name:  John Gutierrez    :  1990  MRN: [id number]    Restrictions/Precautions:    Diagnosis:  Left Shoulder Injury  Treatment Diagnosis:    Insurance/Certification information:  [employer] (18 thru 2022)  Referring Physician:  Dr. Cosme Mirza of care signed (Y/N):    Visit# / total visits:    Pain level:  0/10  Time In: 7:20       Time Out: 8:30        Subjective: Pt reports he has had some soreness in left superior shoulder area this week. No ant shoulder/pec issues. Exercises:  Exercise/Equipment Resistance/Repetitions Other comments   UBE 10 min fwd/bckwd   Cybex 360  extension 3 pl 2x15                      rowing 3pl  2 x 15  Limited range                    Fwd press 3 pl  pl 105x   (from neutral) Limited range                    ER 2 pl 10 x 2 Towel under arm                    IR 1.5 pl 10 x 2 Towel under arm          Shoulder shrugs  10# 10 x 2    Scapular retraction 10# 10 x 2             Bicep curls 10# 10 x 2    Tricep kick backs 10# 10 x 2           Ball pushups @ wall  10 x limited range             Seated flex with ball  10 reps       Supine pec str  10 reps            BTE push/pull lever   3 sets      Other Therapeutic Activities:Crate carry 20# Waist level 3 min                                                        Crate lift 6 reps x 2 20#                                                     Crate lift to high shelf x w/15#  Overhead nut and bolt remove and return (3 min)    Home Exercise Program:      Manual Treatments:      Modalities: IFC and ice to left shoulder x 20 min for pain control.     Timed Code Treatment Minutes:  65    Total Treatment Minutes:  70    Treatment/Activity Tolerance:  [x] Patient tolerated treatment well [] Patient limited by fatigue  [] Patient limited by pain  [] Patient limited by other medical complications  [x] Other: Pt performed exercises/activities with good technique and no report of pain increases. Plan:   [x] Continue per plan of care [] Alter current plan (see comments)  [] Plan of care initiated [] Hold pending MD visit [] Discharge  Plan for Next Session:         Treatment Charges: Mins Units   Initial Evaluation     Re-Evaluation     Ther Exercise         TE 30 2   Manual Therapy     MT     Ther Activities        TA 15 1   Gait Training          GT     Neuro Re-education NR     Modalities 20 1   Non-Billable Service Time     Other 5    Total Time/Units 70 4     Electronically signed by:   John Daugherty

## 2022-04-25 ENCOUNTER — HOSPITAL ENCOUNTER (OUTPATIENT)
Dept: PHYSICAL THERAPY | Age: 32
Setting detail: THERAPIES SERIES
Discharge: HOME | End: 2022-04-25
Payer: COMMERCIAL

## 2022-04-25 PROCEDURE — 97110 THERAPEUTIC EXERCISES: CPT

## 2022-04-25 NOTE — PROGRESS NOTES
[facility]  Phone: [phone] Fax: [phone]       Physical Therapy Daily Treatment Note  Date:  2022    Patient Name:  Rob Wilkinson    :  1990  MRN: [id number]    Restrictions/Precautions:    Diagnosis:  Left Shoulder Injury  Treatment Diagnosis:    Insurance/Certification information:  [employer] (18 thru 2022)  Referring Physician:  Dr. Chau Blunt of care signed (Y/N):    Visit# / total visits:    Pain level:  0/10  Time In: 7:20       Time Out: 8:10        Subjective:     Exercises:  Exercise/Equipment Resistance/Repetitions Other comments   UBE 10 min fwd/bckwd   Cybex 360  extension 3 pl 2x10                      rowing 3pl  2 x 10  Limited range                    Fwd press 3 pl  pl 10 x   (from neutral) Limited range                    ER 1.5 pl 10 x 2 Towel under arm                    IR 1.5 pl 10 x 2 Towel under arm          Shoulder shrugs  10# 10 x 2    Scapular retraction 10# 10 x 2             Bicep curls 10# 10 x 2    Tricep kick backs 10# 10 x 2           Ball pushups @ wall  10 x limited range             Seated flex with ball  10 reps       Supine pec str  10 reps            BTE push/pull lever   3 sets      Other Therapeutic Activities:Crate carry 20# Waist level 3 min                                                        Crate lift 6 reps x 2 20#                                                     Crate lift to high shelf x w/15#  Overhead nut and bolt remove and return (3 min)    Home Exercise Program:      Manual Treatments:      Modalities:  ice to left shoulder x 10 min for pain control. Timed Code Treatment Minutes:  45    Total Treatment Minutes:  50    Treatment/Activity Tolerance:  [x] Patient tolerated treatment well [] Patient limited by fatigue  [] Patient limited by pain  [] Patient limited by other medical complications  [x] Other: Good tolerance for progression of exercises. No pain reported. Plan:   [x] Continue per plan of care [] Alter current plan (see comments)  [] Plan of care initiated [] Hold pending MD visit [] Discharge  Plan for Next Session:         Treatment Charges: Mins Units   Initial Evaluation     Re-Evaluation     Ther Exercise         TE 40 2   Manual Therapy     MT     Ther Activities        TA     Gait Training          GT     Neuro Re-education NR     Modalities  1   Non-Billable Service Time     Other 5    Total Time/Units 50 2     Electronically signed by:  Neeraj Palm, [address] [facility]

## 2022-04-26 ENCOUNTER — HOSPITAL ENCOUNTER (EMERGENCY)
Age: 32
Discharge: LEFT BEFORE DISCHARGE | End: 2022-04-26

## 2022-04-26 VITALS — OXYGEN SATURATION: 100 % | TEMPERATURE: 97.6 F | HEART RATE: 62 BPM

## 2022-04-27 ENCOUNTER — HOSPITAL ENCOUNTER (OUTPATIENT)
Dept: PHYSICAL THERAPY | Age: 32
Setting detail: THERAPIES SERIES
Discharge: HOME | End: 2022-04-27
Payer: COMMERCIAL

## 2022-04-27 PROCEDURE — 97530 THERAPEUTIC ACTIVITIES: CPT

## 2022-04-27 PROCEDURE — 97110 THERAPEUTIC EXERCISES: CPT

## 2022-04-27 NOTE — PROGRESS NOTES
[facility]  Phone: [phone] Fax: [phone]       Physical Therapy Daily Treatment Note  Date:  2022    Patient Name:  Del Oneal    :  1990  MRN: [id number]    Restrictions/Precautions:    Diagnosis:  Left Shoulder Injury  Treatment Diagnosis:    Insurance/Certification information:  Mayela Perez (18 thru 2022)  Referring Physician:  Dr. Walker Killer of care signed (Y/N):    Visit# / total visits:  10/18  Pain level:  0/10  Time In: 7:20       Time Out: 8:10        Subjective:     Exercises:  Exercise/Equipment Resistance/Repetitions Other comments   UBE 10 min fwd/bckwd   Cybex 360  extension 3 pl 2x10                      rowing 3pl  2 x 10  Limited range                    Fwd press 3 pl  pl 10 x   (from neutral) Limited range                    ER 1.5 pl 10 x 2 Towel under arm                    IR 1.5 pl 10 x 2 Towel under arm          Shoulder shrugs  10# 10 x 2    Scapular retraction 10# 10 x 2             Bicep curls 10# 10 x 2    Tricep kick backs 10# 10 x 2           Ball pushups @ wall  10 x limited range             Seated flex with ball  10 reps       Supine pec str  10 reps            BTE push/pull lever   3 sets      Other Therapeutic Activities:Crate carry 20# Waist level 3 min                                                        Crate lift 6 reps x 2 20#                                                     Crate lift to high shelf x w/15#  Overhead nut and bolt remove and return (3 min)    Home Exercise Program:      Manual Treatments:      Modalities:  ice to left shoulder x 10 min for pain control. Timed Code Treatment Minutes:  45    Total Treatment Minutes:  50    Treatment/Activity Tolerance:  [x] Patient tolerated treatment well [] Patient limited by fatigue  [] Patient limited by pain  [] Patient limited by other medical complications  [x] Other: Good tolerance for progression of exercises. No pain reported. Plan:   [x] Continue per plan of care [] Alter current plan (see comments)  [] Plan of care initiated [] Hold pending MD visit [] Discharge  Plan for Next Session:         Treatment Charges: Mins Units   Initial Evaluation     Re-Evaluation     Ther Exercise         TE 40 2   Manual Therapy     MT     Ther Activities        TA     Gait Training          GT     Neuro Re-education NR     Modalities  1   Non-Billable Service Time     Other 5    Total Time/Units 50 2     Electronically signed by:  Qing Gomes, 311 [facility]

## 2022-04-29 ENCOUNTER — HOSPITAL ENCOUNTER (OUTPATIENT)
Dept: PHYSICAL THERAPY | Age: 32
Setting detail: THERAPIES SERIES
Discharge: HOME | End: 2022-04-29
Payer: COMMERCIAL

## 2022-04-29 PROCEDURE — 97110 THERAPEUTIC EXERCISES: CPT

## 2022-04-29 NOTE — PROGRESS NOTES
[facility]  Phone: [phone] Fax: [phone]       Physical Therapy Daily Treatment Note  Date:  2022    Patient Name:  Charline Schwartz    :  1990  MRN: [id number]    Restrictions/Precautions:    Diagnosis:  Left Shoulder Injury  Treatment Diagnosis:    Insurance/Certification information:  [employer] [place] (18 thru 2022)  Referring Physician:  Dr. Bruce Lei of care signed (Y/N):    Visit# / total visits:    Pain level:  0/10  Time In: 7:20       Time Out: 8:10        Subjective:     Exercises:  Exercise/Equipment Resistance/Repetitions Other comments   UBE 10 min fwd/bckwd   Cybex 360  extension 3 pl 2x10                      rowing 3pl  2 x 10  Limited range                    Fwd press 3 pl  pl 10 x   (from neutral) Limited range                    ER 1.5 pl 10 x 2 Towel under arm                    IR 1.5 pl 10 x 2 Towel under arm          Shoulder shrugs  10# 10 x 2    Scapular retraction 10# 10 x 2             Bicep curls 10# 10 x 2    Tricep kick backs 10# 10 x 2           Ball pushups @ wall  10 x limited range             Seated flex with ball  10 reps       Supine pec str  10 reps            BTE push/pull lever   3 sets      Other Therapeutic Activities:Crate carry 20# Waist level 3 min                                                        Crate lift 6 reps x 2 20#                                                     Crate lift to high shelf x w/15#  Overhead nut and bolt remove and return (3 min)    Home Exercise Program:      Manual Treatments:      Modalities:  ice to left shoulder x 10 min for pain control. Timed Code Treatment Minutes:  45    Total Treatment Minutes:  50    Treatment/Activity Tolerance:  [x] Patient tolerated treatment well [] Patient limited by fatigue  [] Patient limited by pain  [] Patient limited by other medical complications  [x] Other: Good tolerance for progression of exercises. No pain reported. Plan:   [x] Continue per plan of care [] Alter current plan (see comments)  [] Plan of care initiated [] Hold pending MD visit [] Discharge  Plan for Next Session:         Treatment Charges: Mins Units   Initial Evaluation     Re-Evaluation     Ther Exercise         TE 40 2   Manual Therapy     MT     Ther Activities        TA     Gait Training          GT     Neuro Re-education NR     Modalities  1   Non-Billable Service Time     Other 5    Total Time/Units 50 2     Electronically signed by:  Nat Morales, 311 [facility]

## 2022-05-02 ENCOUNTER — HOSPITAL ENCOUNTER (OUTPATIENT)
Dept: PHYSICAL THERAPY | Age: 32
Setting detail: THERAPIES SERIES
Discharge: HOME | End: 2022-05-02
Payer: COMMERCIAL

## 2022-05-02 PROCEDURE — 97530 THERAPEUTIC ACTIVITIES: CPT

## 2022-05-02 PROCEDURE — 97110 THERAPEUTIC EXERCISES: CPT

## 2022-05-02 NOTE — PROGRESS NOTES
[facility]  Phone: [phone] Fax: [phone]       Physical Therapy Daily Treatment Note  Date:  2022    Patient Name:  John Gutierrez    :  1990  MRN: [id number]    Restrictions/Precautions:    Diagnosis:  Left Shoulder Injury  Treatment Diagnosis:    Insurance/Certification information:  [employer] [place] (18 thru 2022)  Referring Physician:  Dr. Marcella Pickens of care signed (Y/N):    Visit# / total visits:    Pain level:  0/10  Time In: 7:20       Time Out: 8:10        Subjective:     Exercises:  Exercise/Equipment Resistance/Repetitions Other comments   UBE 10 min fwd/bckwd   Cybex 360  extension 3 pl 2x10                      rowing 3pl  2 x 10  Limited range                    Fwd press 3 pl  pl 10 x   (from neutral) Limited range                    ER 1.5 pl 10 x 2 Towel under arm                    IR 1.5 pl 10 x 2 Towel under arm          Shoulder shrugs  10# 10 x 2    Scapular retraction 10# 10 x 2             Bicep curls 10# 10 x 2    Tricep kick backs 10# 10 x 2           Ball pushups @ wall  10 x limited range             Seated flex with ball  10 reps       Supine pec str  10 reps            BTE push/pull lever   3 sets      Other Therapeutic Activities:Crate carry 20# Waist level 3 min                                                        Crate lift 6 reps x 2 20#                                                     Crate lift to high shelf x w/15#  Overhead nut and bolt remove and return (3 min)    Home Exercise Program:      Manual Treatments:      Modalities:  ice to left shoulder x 10 min for pain control. Timed Code Treatment Minutes:  45    Total Treatment Minutes:  50    Treatment/Activity Tolerance:  [x] Patient tolerated treatment well [] Patient limited by fatigue  [] Patient limited by pain  [] Patient limited by other medical complications  [x] Other: Good tolerance for progression of exercises. No pain reported. Plan:   [x] Continue per plan of care [] Alter current plan (see comments)  [] Plan of care initiated [] Hold pending MD visit [] Discharge  Plan for Next Session:         Treatment Charges: Mins Units   Initial Evaluation     Re-Evaluation     Ther Exercise         TE 40 2   Manual Therapy     MT     Ther Activities        TA     Gait Training          GT     Neuro Re-education NR     Modalities  1   Non-Billable Service Time     Other 5    Total Time/Units 50 2     Electronically signed by:  Qing Gomes, 311 [facility]

## 2022-05-04 ENCOUNTER — HOSPITAL ENCOUNTER (OUTPATIENT)
Dept: PHYSICAL THERAPY | Age: 32
Setting detail: THERAPIES SERIES
Discharge: HOME | End: 2022-05-04
Payer: COMMERCIAL

## 2022-05-04 PROCEDURE — 97530 THERAPEUTIC ACTIVITIES: CPT

## 2022-05-04 PROCEDURE — 97110 THERAPEUTIC EXERCISES: CPT

## 2022-05-04 NOTE — PROGRESS NOTES
[facility]  Phone: [phone] Fax: [phone]       Physical Therapy Daily Treatment Note  Date:  2022    Patient Name:  Sherrell Hernandez    :  1990  MRN: [id number]    Restrictions/Precautions:    Diagnosis:  Left Shoulder Injury  Treatment Diagnosis:    Insurance/Certification information:  [employer] [place] [place] (18 thru 2022)  Referring Physician:  Dr. Fany Will of care signed (Y/N):    Visit# / total visits:    Pain level:  0/10  Time In: 7:20       Time Out: 8:10        Subjective:     Exercises:  Exercise/Equipment Resistance/Repetitions Other comments   UBE 10 min fwd/bckwd   Cybex 360  extension 3 pl 2x10                      rowing 3pl  2 x 10  Limited range                    Fwd press 3 pl  pl 10 x   (from neutral) Limited range                    ER 1.5 pl 10 x 2 Towel under arm                    IR 1.5 pl 10 x 2 Towel under arm          Shoulder shrugs  10# 10 x 2    Scapular retraction 10# 10 x 2             Bicep curls 10# 10 x 2    Tricep kick backs 10# 10 x 2           Ball pushups @ wall  10 x limited range             Seated flex with ball  10 reps       Supine pec str  10 reps            BTE push/pull lever   3 sets      Other Therapeutic Activities:Crate carry 20# Waist level 3 min                                                        Crate lift 6 reps x 2 20#                                                     Crate lift to high shelf x w/15#  Overhead nut and bolt remove and return (3 min)    Home Exercise Program:      Manual Treatments:      Modalities:  ice to left shoulder x 10 min for pain control. Timed Code Treatment Minutes:  45    Total Treatment Minutes:  50    Treatment/Activity Tolerance:  [x] Patient tolerated treatment well [] Patient limited by fatigue  [] Patient limited by pain  [] Patient limited by other medical complications  [x] Other: Good tolerance for progression of exercises. No pain reported. Plan:   [x] Continue per plan of care [] Alter current plan (see comments)  [] Plan of care initiated [] Hold pending MD visit [] Discharge  Plan for Next Session:         Treatment Charges: Mins Units   Initial Evaluation     Re-Evaluation     Ther Exercise         TE 40 2   Manual Therapy     MT     Ther Activities        TA     Gait Training          GT     Neuro Re-education NR     Modalities  1   Non-Billable Service Time     Other 5    Total Time/Units 50 2     Electronically signed by:  Anup Smith, 311 [facility]

## 2022-05-06 ENCOUNTER — HOSPITAL ENCOUNTER (OUTPATIENT)
Dept: PHYSICAL THERAPY | Age: 32
Setting detail: THERAPIES SERIES
Discharge: HOME | End: 2022-05-06
Payer: COMMERCIAL

## 2022-05-06 PROCEDURE — 97530 THERAPEUTIC ACTIVITIES: CPT

## 2022-05-06 PROCEDURE — 97110 THERAPEUTIC EXERCISES: CPT

## 2022-05-06 NOTE — PROGRESS NOTES
[facility]  Phone: [phone] Fax: [phone]       Physical Therapy Daily Treatment Note  Date:  2022    Patient Name:  Italo Keane    :  1990  MRN: [id number]    Restrictions/Precautions:    Diagnosis:  Left Shoulder Injury  Treatment Diagnosis:    Insurance/Certification information:  [employer] [place] (18 thru 2022)  Referring Physician:  Dr. Drew Temple of care signed (Y/N):    Visit# / total visits:    Pain level:  0/10  Time In: 7:20       Time Out: 8:10        Subjective:     Exercises:  Exercise/Equipment Resistance/Repetitions Other comments   UBE 10 min fwd/bckwd   Cybex 360  extension 3 pl 2x10                      rowing 3pl  2 x 10  Limited range                    Fwd press 3 pl  pl 10 x   (from neutral) Limited range                    ER 1.5 pl 10 x 2 Towel under arm                    IR 1.5 pl 10 x 2 Towel under arm          Shoulder shrugs  10# 10 x 2    Scapular retraction 10# 10 x 2             Bicep curls 10# 10 x 2    Tricep kick backs 10# 10 x 2           Ball pushups @ wall  10 x limited range             Seated flex with ball  10 reps       Supine pec str  10 reps            BTE push/pull lever   3 sets      Other Therapeutic Activities:Crate carry 20# Waist level 3 min                                                        Crate lift 6 reps x 2 20#                                                     Crate lift to high shelf x w/15#  Overhead nut and bolt remove and return (3 min)    Home Exercise Program:      Manual Treatments:      Modalities:  ice to left shoulder x 10 min for pain control. Timed Code Treatment Minutes:  45    Total Treatment Minutes:  50    Treatment/Activity Tolerance:  [x] Patient tolerated treatment well [] Patient limited by fatigue  [] Patient limited by pain  [] Patient limited by other medical complications  [x] Other: Good tolerance for progression of exercises. No pain reported. Plan:   [x] Continue per plan of care [] Alter current plan (see comments)  [] Plan of care initiated [] Hold pending MD visit [] Discharge  Plan for Next Session:         Treatment Charges: Mins Units   Initial Evaluation     Re-Evaluation     Ther Exercise         TE 40 2   Manual Therapy     MT     Ther Activities        TA     Gait Training          GT     Neuro Re-education NR     Modalities  1   Non-Billable Service Time     Other 5    Total Time/Units 50 2     Electronically signed by:  Kody García, 311 [facility]

## 2022-05-09 ENCOUNTER — HOSPITAL ENCOUNTER (OUTPATIENT)
Dept: PHYSICAL THERAPY | Age: 32
Setting detail: THERAPIES SERIES
Discharge: HOME | End: 2022-05-09
Payer: COMMERCIAL

## 2022-05-09 PROCEDURE — 97110 THERAPEUTIC EXERCISES: CPT

## 2022-05-09 PROCEDURE — 9900000074 HC THERAPEUTIC ACTIVITIES PER 15 MIN (SELF-PAY)

## 2022-05-09 NOTE — PROGRESS NOTES
[facility]  Phone: [phone] Fax: [phone]       Physical Therapy Daily Treatment Note  Date:  2022    Patient Name:  Vicki Arauz    :  1990  MRN: [id number]    Restrictions/Precautions:    Diagnosis:  Left Shoulder Injury  Treatment Diagnosis:    Insurance/Certification information:  [employer] [place] (18 thru 2022)  Referring Physician:  Dr. Kiley Mendez of care signed (Y/N):    Visit# / total visits:  15/18  Pain level:  0/10  Time In: 7:20       Time Out: 8:10        Subjective:     Exercises:  Exercise/Equipment Resistance/Repetitions Other comments   UBE 10 min fwd/bckwd   Cybex 360  extension 3 pl 2x10                      rowing 3pl  2 x 10  Limited range                    Fwd press 3 pl  pl 10 x   (from neutral) Limited range                    ER 1.5 pl 10 x 2 Towel under arm                    IR 1.5 pl 10 x 2 Towel under arm          Shoulder shrugs  10# 10 x 2    Scapular retraction 10# 10 x 2             Bicep curls 10# 10 x 2    Tricep kick backs 10# 10 x 2           Ball pushups @ wall  10 x limited range             Seated flex with ball  10 reps       Supine pec str  10 reps            BTE push/pull lever   3 sets      Other Therapeutic Activities:Crate carry 20# Waist level 3 min                                                        Crate lift 6 reps x 2 20#                                                     Crate lift to high shelf x w/15#  Overhead nut and bolt remove and return (3 min)    Home Exercise Program:      Manual Treatments:      Modalities:  ice to left shoulder x 10 min for pain control. Timed Code Treatment Minutes:  45    Total Treatment Minutes:  50    Treatment/Activity Tolerance:  [x] Patient tolerated treatment well [] Patient limited by fatigue  [] Patient limited by pain  [] Patient limited by other medical complications  [x] Other: Good tolerance for progression of exercises. No pain reported. Plan:   [x] Continue per plan of care [] Alter current plan (see comments)  [] Plan of care initiated [] Hold pending MD visit [] Discharge  Plan for Next Session:         Treatment Charges: Mins Units   Initial Evaluation     Re-Evaluation     Ther Exercise         TE 40 2   Manual Therapy     MT     Ther Activities        TA     Gait Training          GT     Neuro Re-education NR     Modalities  1   Non-Billable Service Time     Other 5    Total Time/Units 50 2     Electronically signed by:  Elby Leyden, 311 [facility]

## 2022-05-11 ENCOUNTER — HOSPITAL ENCOUNTER (OUTPATIENT)
Dept: PHYSICAL THERAPY | Age: 32
Setting detail: THERAPIES SERIES
Discharge: HOME | End: 2022-05-11
Payer: COMMERCIAL

## 2022-05-11 PROCEDURE — 97110 THERAPEUTIC EXERCISES: CPT

## 2022-05-11 PROCEDURE — 9900000074 HC THERAPEUTIC ACTIVITIES PER 15 MIN (SELF-PAY)

## 2022-05-11 NOTE — PROGRESS NOTES
[facility]  Phone: [phone] Fax: [phone]       Physical Therapy Daily Treatment Note  Date:  2022    Patient Name:  Vicki Arauz    :  1990  MRN: [id number]    Restrictions/Precautions:    Diagnosis:  Left Shoulder Injury  Treatment Diagnosis:    Insurance/Certification information:  [employer] (18 thru 2022)  Referring Physician:  Dr. Kiley Mendez of care signed (Y/N):    Visit# / total visits:    Pain level:  0/10  Time In: 7:20       Time Out: 8:10        Subjective:     Exercises:  Exercise/Equipment Resistance/Repetitions Other comments   UBE 10 min fwd/bckwd   Cybex 360  extension 3 pl 2x10                      rowing 3pl  2 x 10  Limited range                    Fwd press 3 pl  pl 10 x   (from neutral) Limited range                    ER 1.5 pl 10 x 2 Towel under arm                    IR 1.5 pl 10 x 2 Towel under arm          Shoulder shrugs  10# 10 x 2    Scapular retraction 10# 10 x 2             Bicep curls 10# 10 x 2    Tricep kick backs 10# 10 x 2           Ball pushups @ wall  10 x limited range             Seated flex with ball  10 reps       Supine pec str  10 reps            BTE push/pull lever   3 sets      Other Therapeutic Activities:Crate carry 20# Waist level 3 min                                                        Crate lift 6 reps x 2 20#                                                     Crate lift to high shelf x w/15#  Overhead nut and bolt remove and return (3 min)    Home Exercise Program:      Manual Treatments:      Modalities:  ice to left shoulder x 10 min for pain control. Timed Code Treatment Minutes:  45    Total Treatment Minutes:  50    Treatment/Activity Tolerance:  [x] Patient tolerated treatment well [] Patient limited by fatigue  [] Patient limited by pain  [] Patient limited by other medical complications  [x] Other: Good tolerance for progression of exercises. No pain reported. Plan:   [x] Continue per plan of care [] Alter current plan (see comments)  [] Plan of care initiated [] Hold pending MD visit [] Discharge  Plan for Next Session:         Treatment Charges: Mins Units   Initial Evaluation     Re-Evaluation     Ther Exercise         TE 40 2   Manual Therapy     MT     Ther Activities        TA     Gait Training          GT     Neuro Re-education NR     Modalities  1   Non-Billable Service Time     Other 5    Total Time/Units 50 2     Electronically signed by:  Emi Castro, 311 [facility]

## 2022-05-13 ENCOUNTER — HOSPITAL ENCOUNTER (OUTPATIENT)
Dept: PHYSICAL THERAPY | Age: 32
Setting detail: THERAPIES SERIES
Discharge: HOME | End: 2022-05-13
Payer: COMMERCIAL

## 2022-05-13 PROCEDURE — 97110 THERAPEUTIC EXERCISES: CPT

## 2022-05-13 PROCEDURE — 97530 THERAPEUTIC ACTIVITIES: CPT

## 2022-05-13 NOTE — PROGRESS NOTES
[facility]  Phone: [phone] Fax: [phone]       Physical Therapy Daily Treatment Note  Date:  2022    Patient Name:  Arlen Pinon    :  1990  MRN: [id number]    Restrictions/Precautions:    Diagnosis:  Left Shoulder Injury  Treatment Diagnosis:    Insurance/Certification information:  [employer] - [place] [place] (18 thru 2022)  Referring Physician:  Dr. Peter Reyes of care signed (Y/N):    Visit# / total visits:    Pain level:  0/10  Time In: 7:20       Time Out: 8:10        Subjective:     Exercises:  Exercise/Equipment Resistance/Repetitions Other comments   UBE 10 min fwd/bckwd   Cybex 360  extension 3 pl 2x10                      rowing 3pl  2 x 10  Limited range                    Fwd press 3 pl  pl 10 x   (from neutral) Limited range                    ER 1.5 pl 10 x 2 Towel under arm                    IR 1.5 pl 10 x 2 Towel under arm          Shoulder shrugs  10# 10 x 2    Scapular retraction 10# 10 x 2             Bicep curls 10# 10 x 2    Tricep kick backs 10# 10 x 2           Ball pushups @ wall  10 x limited range             Seated flex with ball  10 reps       Supine pec str  10 reps            BTE push/pull lever   3 sets      Other Therapeutic Activities:Crate carry 20# Waist level 3 min                                                        Crate lift 6 reps x 2 20#                                                     Crate lift to high shelf x w/15#  Overhead nut and bolt remove and return (3 min)    Home Exercise Program:      Manual Treatments:      Modalities:  ice to left shoulder x 10 min for pain control. Timed Code Treatment Minutes:  45    Total Treatment Minutes:  50    Treatment/Activity Tolerance:  [x] Patient tolerated treatment well [] Patient limited by fatigue  [] Patient limited by pain  [] Patient limited by other medical complications  [x] Other: Good tolerance for progression of exercises. No pain reported. Plan:   [] Continue per plan of care [] Alter current plan (see comments)  [] Plan of care initiated [] Hold pending MD visit [x] Discharge  Plan for Next Session:         Treatment Charges: Mins Units   Initial Evaluation     Re-Evaluation     Ther Exercise         TE 40 2   Manual Therapy     MT     Ther Activities        TA     Gait Training          GT     Neuro Re-education NR     Modalities  1   Non-Billable Service Time     Other 5    Total Time/Units 50 2     Electronically signed by:  Audelia Ordonez, 311 [facility]

## 2024-03-18 ENCOUNTER — HOSPITAL ENCOUNTER (OUTPATIENT)
Dept: GENERAL RADIOLOGY | Age: 34
Discharge: HOME | End: 2024-03-20
Payer: COMMERCIAL

## 2024-03-18 ENCOUNTER — HOSPITAL ENCOUNTER (OUTPATIENT)
Age: 34
Discharge: HOME | End: 2024-03-20
Payer: COMMERCIAL

## 2024-03-18 DIAGNOSIS — M25.562 LEFT KNEE PAIN, UNSPECIFIED CHRONICITY: ICD-10-CM

## 2024-03-18 DIAGNOSIS — S80.02XA CONTUSION OF LEFT KNEE, INITIAL ENCOUNTER: ICD-10-CM

## 2024-03-18 PROCEDURE — 73564 X-RAY EXAM KNEE 4 OR MORE: CPT

## 2025-04-14 NOTE — PROGRESS NOTES
Exercise         TE 30 2   Manual Therapy     MT     Ther Activities        TA     Gait Training          GT     Neuro Re-education NR     Modalities 20 1   Non-Billable Service Time 10    Other     Total Time/Units 60 3     Electronically signed by:  Maciej Haider, [address] [place] [address]
yes